# Patient Record
Sex: MALE | Race: OTHER | URBAN - METROPOLITAN AREA
[De-identification: names, ages, dates, MRNs, and addresses within clinical notes are randomized per-mention and may not be internally consistent; named-entity substitution may affect disease eponyms.]

---

## 2017-03-14 ENCOUNTER — OFFICE VISIT (OUTPATIENT)
Dept: FAMILY MEDICINE CLINIC | Age: 32
End: 2017-03-14

## 2017-03-14 DIAGNOSIS — K29.70 GASTRITIS WITHOUT BLEEDING, UNSPECIFIED CHRONICITY, UNSPECIFIED GASTRITIS TYPE: ICD-10-CM

## 2017-03-14 DIAGNOSIS — G44.209 TENSION-TYPE HEADACHE, NOT INTRACTABLE, UNSPECIFIED CHRONICITY PATTERN: ICD-10-CM

## 2017-03-14 DIAGNOSIS — Z13.9 SCREENING: Primary | ICD-10-CM

## 2017-03-15 VITALS
BODY MASS INDEX: 32.58 KG/M2 | SYSTOLIC BLOOD PRESSURE: 135 MMHG | WEIGHT: 215 LBS | HEART RATE: 73 BPM | HEIGHT: 68 IN | TEMPERATURE: 99 F | DIASTOLIC BLOOD PRESSURE: 87 MMHG

## 2017-03-15 LAB — GLUCOSE POC: 87 MG/DL

## 2018-03-06 ENCOUNTER — OFFICE VISIT (OUTPATIENT)
Dept: FAMILY MEDICINE CLINIC | Age: 33
End: 2018-03-06

## 2018-03-06 VITALS
DIASTOLIC BLOOD PRESSURE: 83 MMHG | TEMPERATURE: 98.3 F | RESPIRATION RATE: 20 BRPM | OXYGEN SATURATION: 100 % | WEIGHT: 214 LBS | HEIGHT: 68 IN | HEART RATE: 61 BPM | SYSTOLIC BLOOD PRESSURE: 118 MMHG | BODY MASS INDEX: 32.43 KG/M2

## 2018-03-06 DIAGNOSIS — H10.33 ACUTE CONJUNCTIVITIS OF BOTH EYES, UNSPECIFIED ACUTE CONJUNCTIVITIS TYPE: ICD-10-CM

## 2018-03-06 DIAGNOSIS — Z23 ENCOUNTER FOR IMMUNIZATION: ICD-10-CM

## 2018-03-06 DIAGNOSIS — R05.9 COUGH: Primary | ICD-10-CM

## 2018-03-06 RX ORDER — AZITHROMYCIN 250 MG/1
TABLET, FILM COATED ORAL
Qty: 6 TAB | Refills: 0 | Status: SHIPPED | OUTPATIENT
Start: 2018-03-06 | End: 2018-03-11

## 2018-03-06 RX ORDER — OFLOXACIN 3 MG/ML
3 SOLUTION/ DROPS OPHTHALMIC 4 TIMES DAILY
Qty: 10 ML | Refills: 0 | Status: SHIPPED | OUTPATIENT
Start: 2018-03-06 | End: 2018-03-13

## 2018-03-06 RX ORDER — OMEPRAZOLE 20 MG/1
20 CAPSULE, DELAYED RELEASE ORAL DAILY
Qty: 30 CAP | Refills: 2 | Status: SHIPPED | OUTPATIENT
Start: 2018-03-06

## 2018-03-06 NOTE — PROGRESS NOTES
Michelle Clay is a 28 y.o. male    Chief Complaint   Patient presents with    Sore Throat     \"itchy\"    Cough     nonproductive    Itchy Eye     bilateral

## 2018-03-06 NOTE — PATIENT INSTRUCTIONS
Zyrtec, Claritin or Allegra        Conjuntivitis: Instrucciones de cuidado - [ Pinkeye: Care Instructions ]  Instrucciones de cuidado    La conjuntivitis es el enrojecimiento y la hinchazón de la superficie del wil y de la conjuntiva (el recubrimiento del párpado y de la parte jeffery del wil). La conjuntivitis suele ser causada por arlene infección bacteriana o viral. El aire seco, las [place], [place] humo y las sustancias químicas son otras causas comunes. La conjuntivitis suele sanar por sí jose guadalupe al cabo de 7 a 10 días. Los antibióticos solo ayudan si la conjuntivitis está causada por bacterias. La conjuntivitis causada por arlene infección se propaga fácilmente. Si arlene alergia o sustancia química es la causa de la conjuntivitis, esta no desaparecerá a menos que usted evite lo que la esté causando. La atención de seguimiento es arlene parte clave de diop tratamiento y seguridad. Asegúrese de hacer y acudir a todas las citas, y llame a diop médico si está teniendo problemas. También es arlene buena idea saber los resultados de los exámenes y mantener arlene lista de los medicamentos que kiana. ¿Cómo puede cuidarse en el hogar? · Lávese las johnathan con frecuencia. Siempre láveselas antes y después de tratarse la conjuntivitis o de tocarse los ojos o la kyler. · Utilice un algodón húmedo o un paño limpio y húmedo para retirar las costras. Limpie desde la esquina interior del wil hacia afuera. Use arlene parte limpia del paño para cada pasada. · Colóquese paños húmedos, fríos o tibios, sobre el wil unas cuantas veces al día si el wil le duele. · No use lentes de contacto ni maquillaje para los ojos hasta que la conjuntivitis haya desaparecido. Deseche todo el maquillaje para ojos que usaba cuando comenzó la conjuntivitis. Limpie real lentes de contacto y diop estuche. Si [place] lentes de contacto desechables, use un par nuevo cuando el wil haya sanado y sea seguro volver a usar lentes de contacto.   · Si el médico le recetó arlene pomada o gotas antibióticas para los ojos, [facility]. Use el medicamento todo el tiempo indicado, aunque el wil comience a verse mejor en poco tiempo. Mantenga limpia la punta del frasco y no permita que la misma toque la lars del wil. · Para ponerse gotas para los ojos o pomada:  ¨ Incline la [place] atrás y lleve el párpado inferior hacia abajo con un dedo. ¨ Deje caer unas gotas o un chorrito del medicamento dentro del párpado inferior. ¨ Cierre el wil por entre 30 y 61 segundos para permitir que las gotas o la pomada se esparzan. ¨ No permita que la punta del gotero o del tubo de pomada toque las pestañas ni ninguna otra superficie. · No comparta toallas de baño, almohadas ni toallitas para la kyler mientras tenga conjuntivitis. ¿Cuándo debe pedir ayuda? Llame a diop médico ahora mismo o busque atención médica inmediata si:  ? · Tiene dolor en el wil, no solo irritación en la superficie. ? · Tiene algún cambio en la vista o pérdida de la visión. ? · Tiene mayor secreción del wil. ? · El wil no ha comenzado a mejorar o empeora dentro de las 50 horas después de empezar a usar antibióticos. ? · La conjuntivitis dura más de 7 días. ?Preste especial atención a los cambios en diop lópez y asegúrese de comunicarse con diop médico si tiene algún problema. ¿Dónde puede encontrar más información en inglés? Gentry Hams a http://chikis-emil.info/. Gray Lopez Y392 en la búsqueda para aprender más acerca de \"Conjuntivitis: Instrucciones de cuidado - [ Pinkeye: Care Instructions ]. \"  Revisado: 20 Ritu Pair 2017  Versión del contenido: 11.4  © 6451-4484 Healthwise, [employer] [facility]. Las instrucciones de cuidado fueron adaptadas bajo licencia por [employer] (which disclaims liability or warranty for this information). Si usted tiene [place] afección médica o sobre estas instrucciones, siempre pregunte a diop profesional de lópez.  AVST, [facility] niega toda mieshaa o responsabilidad por diop uso de esta información.

## 2018-03-06 NOTE — MR AVS SNAPSHOT
[address] [place] [address] 
[phone] Patient: Ashley Medina MRN: [id number] ALESHIA:[id number] Visit Information Antoinette Chirinos Personal Médico Departamento Teléfono del Dep. Número de visita 3/6/2018 10:45 AM Jeremy Peoples MD [facility] [phone] Follow-up Instructions Return 6 weeks if symptoms worsen or fail to improve. Upcoming Health Maintenance Date Due DTaP/Tdap/Td series (1 - Tdap) 8/5/2006 Influenza Age 5 to Adult 8/1/2017 Alergias  Review Complete El: 3/6/2018 Por: Jeremy Peoples MD  
 A partir del:  3/6/2018 No Known Allergies Vacunas actuales Lilo Drummond No hay ninguna vacuna archivada. No revisadas esta visita Partes vitales PS Pulso Temperatura Resp [place] ( percentil de crecimiento) Peso (percentil de crecimiento)  
 118/83 (BP 1 Location: Left arm, BP Patient Position: Sitting) 61 98.3 °F (36.8 °C) (Oral) 20 5' 8.11\" (1.73 m) 214 lb (97.1 kg) SpO2 BMI ([facility]) Estatus de tabaquísmo 100% 32.43 kg/m2 Never Smoker Historial de signos vitales BMI and BSA Data Body Mass Index Body Surface Area  
 32.43 kg/m 2 2.16 m 2 [facility] Name Phone [address] [place], [address] [facility] [phone] Baltazar lista de medicamentos actualizada Verneice Alexandrea actualizada 3/6/18  1:38 PM.  Marcelina Cuff use baltazar lista de medicamentos más reciente. azithromycin 250 mg tablet También conocido jose manuel:  Jinx Forge Take 2 tablets today, then take 1 tablet daily. [place] 2 tab hoy, despues tome 1 tab diario. ofloxacin 0.3 % ophthalmic solution También conocido jose manuel:  FLOXIN Administer 3 Drops to both eyes four (4) times daily for 7 days. omeprazole 20 mg capsule También conocido jose manuel:  Falisbethe Bound  
 Take 1 Cap by mouth daily. [place] 1 tableta arlene vez al perfecto para acides Recetas Enviado a la [place] Refills  
 azithromycin (ZITHROMAX) 250 mg tablet 0 Sig: Take 2 tablets today, then take 1 tablet daily. [place] 2 tab hoy, despues tome 1 tab diario. Class: Normal  
 Pharmacy: [facility], [facility] [address] AT [address] [place] [address] Ph #: [phone]  
 ofloxacin (FLOXIN) 0.3 % ophthalmic solution 0 Sig: Administer 3 Drops to both eyes four (4) times daily for 7 days. Class: Normal  
 [facility]acy: [facility], [address] [place] [address] Ph #: [phone] Route: Both Eyes  
 omeprazole (PRILOSEC) 20 mg capsule 2 Sig: Take 1 Cap by mouth daily. [place] 1 tableta arlene vez al perfecto para acides Class: Normal  
 [facility], [address] [place] [address] Ph #: [phone] Route: Oral  
  
Instrucciones de seguimiento Return 6 weeks if symptoms worsen or fail to improve. Instrucciones para el Paciente Zyrtec, Claritin or Allegra Conjuntivitis: Instrucciones de cuidado - [ Pinkeye: Care Instructions ] Instrucciones de cuidado La conjuntivitis es el enrojecimiento y la hinchazón de la superficie del wil y de la conjuntiva (el recubrimiento del párpado y de la parte jeffery del wil). La conjuntivitis suele ser causada por arlene infección bacteriana o viral. El aire seco, las [place], [place] humo y las sustancias químicas son otras causas comunes. La conjuntivitis suele sanar por sí jose guadalupe al cabo de 7 a 10 días. Los antibióticos solo ayudan si la conjuntivitis está causada por bacterias. La conjuntivitis causada por arlene infección se propaga fácilmente. Si arlene alergia o sustancia química es la causa de la conjuntivitis, esta no desaparecerá a menos que usted evite lo que la esté causando.  
Delicia Carmona de seguimiento es arlene parte clave de diop tratamiento y seguridad. Asegúrese de hacer y acudir a todas las citas, y llame a diop médico si está teniendo problemas. También es arlene buena idea saber los resultados de los exámenes y mantener arlene lista de los medicamentos que kiana. Cómo puede cuidarse en el hogar? · Lávese las johnathan con frecuencia. Siempre láveselas antes y después de tratarse la conjuntivitis o de tocarse los ojos o la kyler. · Utilice un algodón húmedo o un paño limpio y húmedo para retirar las costras. Limpie desde la esquina interior del wil hacia afuera. Use arlene parte limpia del paño para cada pasada. · Colóquese paños húmedos, fríos o tibios, sobre el wil unas cuantas veces al día si el wil le duele. · No use lentes de contacto ni maquillaje para los ojos hasta que la conjuntivitis haya desaparecido. Deseche todo el maquillaje para ojos que usaba cuando comenzó la conjuntivitis. Limpie real lentes de contacto y diop estuche. Si [place] lentes de contacto desechables, use un par nuevo cuando el wil haya sanado y sea seguro volver a usar lentes de contacto. · Si el médico le recetó arlene pomada o gotas antibióticas para los ojos, úselas según las indicaciones. Use el medicamento todo el tiempo indicado, aunque el wil comience a verse mejor en poco tiempo. Mantenga limpia la punta del frasco y no permita que la misma toque la lars del wil. · Para ponerse gotas para los ojos o pomada: 
¨ Incline la [place] atrás y lleve el párpado inferior hacia abajo con un dedo. ¨ Deje caer unas gotas o un chorrito del medicamento dentro del párpado inferior. ¨ Cierre el wil por entre 30 y 61 segundos para permitir que las gotas o la pomada se esparzan. ¨ No permita que la punta del gotero o del tubo de pomada toque las pestañas ni ninguna otra superficie. · No comparta toallas de baño, almohadas ni toallitas para la kyler mientras tenga conjuntivitis. Cuándo debe pedir ayuda?  
Llame a diop médico ahora mismo o busque atención médica inmediata si: 
 ? · Tiene dolor en el wil, no solo irritación en la superficie. ? · Tiene algún cambio en la vista o pérdida de la visión. ? · Tiene mayor secreción del wil. ? · El wil no ha comenzado a mejorar o empeora dentro de las 50 horas después de empezar a usar antibióticos. ? · La conjuntivitis dura más de 7 días. ?Preste especial atención a los cambios en diop lópez y asegúrese de comunicarse con diop médico si tiene algún problema. Dónde puede encontrar más información en inglés? Aliyah Sarah a [email or web address].[email or web address] Millicent Drop Y392 en la búsqueda para aprender más acerca de \"Conjuntivitis: Instrucciones de cuidado - [ [place]ay[place]: Care Instructions ]. \" 
Revisado: 20 marzo, 2017 Versión del contenido: 11.4 © 1075-7691 Healthwise, Incorporated. Las instrucciones de cuidado fueron adaptadas bajo licencia por [employer] (which disclaims liability or warranty for this information). Si usted tiene [place] afección médica o sobre estas instrucciones, siempre pregunte a diop profesional de lópez. Healthwise, Incorporated niega toda garantía o responsabilidad por diop uso de esta información. Introducing [facility]! [facility] introduce portal paciente MyChart . Ahora se puede acceder a partes de diop expediente médico, enviar por correo electrónico la oficina de diop médico y solicitar renovaciones de medicamentos en línea. En diop navegador de Internet , Teodoro Ulloa a [email or web address] Concepcion clic en el usuario por Kulwinder Alvarez? Beba Sydnie clic aquí en la sesión Verba Decree. Verá la página de registro [place]. Ingrese diop código de [facility] genet y jose manuel aparece a continuación. Usted no tendrá que [employer] código después de roe completado el proceso de registro . Si usted no se inscribe antes de la fecha de caducidad , debe solicitar un nuevo código. · MyChart Código de acceso : [id number] Expires: 6/4/2018 12:40 PM 
 
 Ingresa los últimos cuatro dígitos de diop Número de Seguro Social ( xxxx ) y fecha de nacimiento ( dd / mm / aaaa ) jose manuel se indica y concepcion clic en Enviar. Usted será llevado a la siguiente página de registro . Crear un ID MyChart . Esta será diop ID de inicio de sesión de MyChart y no puede ser [place] , por lo que pensar en arlene que es [place] y fácil de recordar . Crear arlene contraseña MyChart . Usted puede cambiar diop contraseña en cualquier momento . Ingrese diop Password Reset de preguntas y [place] . [place] se puede utilizar en un momento posterior si usted olvida diop contraseña. Introduzca diop dirección de correo electrónico . Daryl Blood recibirá arlene notificación por correo electrónico cuando la nueva información está disponible en MyChart . Chuy Holstein clic en Registrarse. Fletcher Noss jose y descargar porciones de diop expediente médico. 
Concepcion clic en el enlace de descarga del menú Resumen para descargar arlene copia portátil de diop información médica . Si tiene [employer] , por favor visite la sección de preguntas frecuentes del sitio web MyChart . Recuerde, MyChart NO es que se utilizará para las necesidades urgentes. Para emergencias médicas , llame al 911 . Ahora disponible en diop iPhone y Android ! Por favor proporcione gregory resumen de la documentación de cuidado a diop próximo proveedor. If you have any questions after today's visit, please call [phone].

## 2018-03-06 NOTE — PROGRESS NOTES
Statements below were documented by Sofy Aguila [facility]  for this patient visit was Romelia discharged home with AVS and Medication teaching . No further questions. Good rx coupon given for Prilosec , Z-pack , Oflocaxin eye gtt for WalgreensRequest for flu vaccine, patient denies any egg or latex allergy. Patient given VIS sheet and information about flu shot, verbalizes understanding and has no questions. Flu shot administered per protocol after administering injection, patient waited for 15 minutes. Patient showed no signs or symptoms of  allergic reactionPatient denies redness or itching from IM injection site.  Sofy Aguila, RN

## 2018-03-06 NOTE — PROGRESS NOTES
Dominique Alejandra is a 28 y.o. male    Issues discussed today include:    1) Cough:  Started 1 mo ago, not worsening. Non productive. Then developed sore throat 1 week ago. Had cold sxs at outset. Also with itchy throat and eyes since last night. R eye red, with swelling in morning. Some mucus in both eyes. Son with conjunctivitis a few days ago.   + nasal congestion. No fever. Last weeks did have a little HA. + chills and feeling a little slow. Not taking any medications on regularly and not tried any meds for his sxs. No known h/o allergies. + h/o GERD, endorses some indigestion - not currently taking medicine. Data reviewed or ordered today:       Other problems include: There is no problem list on file for this patient. Medications:  No current outpatient prescriptions on file prior to visit. No current facility-administered medications on file prior to visit. Allergies:  No Known Allergies    LMP:  No LMP for male patient. Social History     Social History    Marital status: SINGLE     Spouse name: N/A    Number of children: N/A    Years of education: N/A     Occupational History    Not on file. Social History Main Topics    Smoking status: Never Smoker    Smokeless tobacco: Never Used    Alcohol use No    Drug use: No    Sexual activity: Not on file     Other Topics Concern    Not on file     Social History Narrative       No family history on file.       Physical Exam   Visit Vitals    /83 (BP 1 Location: Left arm, BP Patient Position: Sitting)    Pulse 61    Temp 98.3 °F (36.8 °C) (Oral)    Resp 20    Ht 5' 8.11\" (1.73 m)    Wt 214 lb (97.1 kg)    SpO2 100%    BMI 32.43 kg/m2      BP Readings from Last 3 Encounters:   03/06/18 118/83   03/14/17 135/87   04/01/14 140/82     Constitutional: Appears well, No acute distress, Vitals noted  Psychiatric:  Affect normal, Alert and Oriented to person/place/time  Eyes:  Conjunctiva clear on L and mildly injected on R, no active drainage or eyelid edema  ENT:  External ears and nose normal, Mucous membranes moist. TMs grey and non-bulging bilaterally. OP without erythema, edema or exudate. Bilateral nares without active drainage, edema or polyps. Neck:  General inspection normal. Supple. No lymphadenopathy  Heart:  Normal HR, Normal S1 and S2,  Regular rhythm. No murmurs, rubs or gallops. Lungs:  Clear to auscultation, good respiratory effort, no wheezes, rales or rhonchi  Skin:  Warm to palpation, without rashes      Assessment/Plan:      ICD-10-CM ICD-9-CM    1. Cough R05 786.2 azithromycin (ZITHROMAX) 250 mg tablet      omeprazole (PRILOSEC) 20 mg capsule   2. Acute conjunctivitis of both eyes, unspecified acute conjunctivitis type H10.33 372.00 ofloxacin (FLOXIN) 0.3 % ophthalmic solution   3. Encounter for immunization Z23 V03.89 INFLUENZA VIRUS VAC QUAD,SPLIT,PRESV FREE SYRINGE IM       Cough - possible walking pna given fatigue, chills and cold sxs at outset. May also have GERD contributing. Less likely allergies given no h/o same and eye sxs isolated to current infectious conjunctivitis. Afebrile, so ok for flu vaccine today. Zpack and FQ eye gtts for infections  Take prilosec daily or OTC equivalent if less $  If no relief in 2-3 weeks, then can try antihistamine (names on instructions)      Follow-up Disposition:  Return 6 weeks if symptoms worsen or fail to improve.         Michael Manzanares MD  [address] [place] [address], [place] [facility]